# Patient Record
Sex: FEMALE | Race: WHITE | Employment: OTHER | URBAN - METROPOLITAN AREA
[De-identification: names, ages, dates, MRNs, and addresses within clinical notes are randomized per-mention and may not be internally consistent; named-entity substitution may affect disease eponyms.]

---

## 2018-01-01 ENCOUNTER — HOSPITAL ENCOUNTER (EMERGENCY)
Age: 83
End: 2018-11-05
Attending: EMERGENCY MEDICINE
Payer: MEDICARE

## 2018-01-01 DIAGNOSIS — I46.9 CARDIOPULMONARY ARREST (HCC): Primary | ICD-10-CM

## 2018-01-01 LAB
B-TYPE NATRIURETIC PEPTIDE: 907 PG/ML (ref 0–99.9)
BASE EXCESS VENOUS: -23 (ref -3–3)
CALCIUM IONIZED: 1.02 MMOL/L (ref 1.12–1.32)
CO2: 14 MMOL/L (ref 21–32)
GFR AFRICAN AMERICAN: >60
GFR NON-AFRICAN AMERICAN: >60
GLUCOSE BLD-MCNC: 535 MG/DL (ref 70–99)
HCO3 VENOUS: 11.4 MMOL/L (ref 23–29)
LACTATE: 14.9 MMOL/L (ref 0.4–2)
O2 SAT, VEN: 45 %
PCO2, VEN: 67.5 MM HG (ref 40–50)
PERFORMED ON: ABNORMAL
PERFORMED ON: NORMAL
PH VENOUS: 6.84 (ref 7.35–7.45)
PO2, VEN: 44 MM HG
POC ANION GAP: 20 (ref 10–20)
POC BUN: 18 MG/DL (ref 7–18)
POC CHLORIDE: 104 MMOL/L (ref 99–110)
POC CREATININE: 0.8 MG/DL (ref 0.6–1.2)
POC POTASSIUM: 4.2 MMOL/L (ref 3.5–5.1)
POC SAMPLE TYPE: ABNORMAL
POC SAMPLE TYPE: NORMAL
POC SODIUM: 138 MMOL/L (ref 136–145)
POC TROPONIN I: 0.08 NG/ML (ref 0–0.1)
TCO2 CALC VENOUS: 13 MMOL/L

## 2018-01-01 PROCEDURE — 92950 HEART/LUNG RESUSCITATION CPR: CPT

## 2018-01-01 PROCEDURE — 94799 UNLISTED PULMONARY SVC/PX: CPT

## 2018-01-01 PROCEDURE — 4500000025 HC ED LEVEL 5 PROCEDURE

## 2018-01-01 PROCEDURE — 84484 ASSAY OF TROPONIN QUANT: CPT

## 2018-01-01 PROCEDURE — 99285 EMERGENCY DEPT VISIT HI MDM: CPT

## 2018-01-01 PROCEDURE — 82803 BLOOD GASES ANY COMBINATION: CPT

## 2018-01-01 PROCEDURE — 83880 ASSAY OF NATRIURETIC PEPTIDE: CPT

## 2018-01-01 PROCEDURE — 94770 HC ETCO2 MONITOR DAILY: CPT

## 2018-01-01 PROCEDURE — 80047 BASIC METABLC PNL IONIZED CA: CPT

## 2018-01-01 PROCEDURE — 94761 N-INVAS EAR/PLS OXIMETRY MLT: CPT

## 2018-01-01 PROCEDURE — 83605 ASSAY OF LACTIC ACID: CPT

## 2018-01-01 RX ORDER — MORPHINE SULFATE 10 MG/ML
INJECTION, SOLUTION INTRAMUSCULAR; INTRAVENOUS
Status: DISCONTINUED
Start: 2018-01-01 | End: 2018-01-01 | Stop reason: HOSPADM

## 2018-11-05 NOTE — ED PROVIDER NOTES
O2 Sat, Carlos 45 Not Established %    TC02 (Calc), Carlos 13 Not Established mmol/L    Lactate 14.90 (HH) 0.40 - 2.00 mmol/L    Sample Type CARLOS     Performed on SEE BELOW    POCT Venous   Result Value Ref Range    POC Sodium 138 136 - 145 mmol/L    POC Potassium 4.2 3.5 - 5.1 mmol/L    POC Chloride 104 99 - 110 mmol/L    CO2 14 (LL) 21 - 32 mmol/L    POC Anion Gap 20 10 - 20    POC Glucose 535 (H) 70 - 99 mg/dl    POC BUN 18 7 - 18 mg/dL    POC Creatinine 0.8 0.6 - 1.2 mg/dL    GFR Non-African American >60 >60    GFR African American >60     Calcium, Ion 1.02 (L) 1.12 - 1.32 mmol/L    Sample Type CARLOS     Performed on SEE BELOW    POCT Venous   Result Value Ref Range    POC Troponin I 0.08 0.00 - 0.10 ng/mL    Sample Type CARLOS     Performed on SEE BELOW    POCT Venous   Result Value Ref Range    .0 (H) 0.0 - 99.9 pg/mL    Sample Type CARLOS     Performed on SEE BELOW            RECENT VITALS:   , ,  ,  ,       Procedures         ED Course     Nursing Notes, Past Medical Hx, Past Surgical Hx, Social Hx,Allergies, and Family Hx were reviewed. The patient was given the following medications:  Orders Placed This Encounter   Medications    EPINEPHrine PF 1 MG/10ML injection     Lee Echevarria: cabinet override    morphine 10 MG/ML injection     Lee Echevarria: cabinet override       CONSULTS:  [address] DECISIONMAKING / ASSESSMENT / Jessica Cristine is a 80 y.o. female presents after a full code. [facility] they found her in the EEA and they state they obtained spontaneous circulation but when the patient arrived here we were feeling no pulses patient was in PEA. We immediately started CPR patient was given 2 rounds of epinephrine and had no response. Patient was pronounced at 1524. I spoke with nurse practitioner on-call for her nursing home physician and she stated she would have Dr. Linus Mac sign the death certificate      Clinical Impression     1.

## 2018-11-05 NOTE — FLOWSHEET NOTE
11/05/18 165   Encounter Summary   Services provided to: Family   Referral/Consult From: Nurse   Support System Family members   Complexity of Encounter High   Length of Encounter 30 minutes   Grief and Life Adjustment   Type Grief and loss;Death   Assessment Calm; Approachable;Grieving   Intervention Active listening;Explored feelings, thoughts, concerns   Outcome Comfort;Expressed gratitude;Engaged in conversation; Less anxious, less agitated; Shared reminiscences   PT's daughter with her  seemed OK. PT's grandchildren were being comforted by parents. No other needs at this time.

## 2021-01-20 NOTE — ED NOTES
Family remains at bedside.       Valentino Abrahams, RN  11/05/18 0545
[facility] contacted, pt okay to be released KIMO Simon  11/05/18 6541
Pulse check no pulse palpated, epi x1mg given via picc line.       Raad Morgan RN  11/05/18 1037
NEGATIVE